# Patient Record
Sex: FEMALE | Race: WHITE | NOT HISPANIC OR LATINO | Employment: FULL TIME | URBAN - NONMETROPOLITAN AREA
[De-identification: names, ages, dates, MRNs, and addresses within clinical notes are randomized per-mention and may not be internally consistent; named-entity substitution may affect disease eponyms.]

---

## 2023-02-21 ENCOUNTER — EVALUATION (OUTPATIENT)
Dept: PHYSICAL THERAPY | Facility: CLINIC | Age: 23
End: 2023-02-21

## 2023-02-21 DIAGNOSIS — G89.29 CHRONIC RIGHT-SIDED LOW BACK PAIN WITH RIGHT-SIDED SCIATICA: Primary | ICD-10-CM

## 2023-02-21 DIAGNOSIS — M54.41 CHRONIC RIGHT-SIDED LOW BACK PAIN WITH RIGHT-SIDED SCIATICA: Primary | ICD-10-CM

## 2023-02-21 NOTE — PROGRESS NOTES
PT Evaluation     Today's date: 2023  Patient name: Fernando Martines  : 2000  MRN: [id number]  Referring provider: LEYLA Sands*  Dx:   Encounter Diagnosis     ICD-10-CM    1  Chronic right-sided low back pain with right-sided sciatica  M54 41     G89 29          Assessment  Assessment details: Fernando Martines is a pleasant 25 y o  female presenting to PT with cc of acute exacerbation of chronic low back pain with B radicular pain  Pt  States pain began approx 3 months ago insidiously and has become severe  Upon examination, patient was found to have objective deficits as listed below and is displaying ss consistent with lower cross syndrome mm imbalance with piriformis and l/s hypomobility contributors and B sciatica  Pt  Is experiencing subsequent functional deficits including pain and difficulty walking, standing, and navigating stairs  Pt  Was educated on role of PT to address issues and given initial treatment with HEP  Pt  Would benefit from skilled physical therapy to promote improved function and maximize activity tolerance  Symptom irritability: highUnderstanding of Dx/Px/POC: good  Goals  ST weeks  -Pt  Will demonstrate L/S flexion AROM improvement of 25%  -Pt  Will demonstrate improved core stabilizer contraction, promoting improved muscle balance  LT weeks  -Pt  Will demonstrate ability to walk 1 mile without pain, demonstrating improved functional mobility   -Pt  Will demonstrate L/S AROM WNL  -Pt  Will demonstrate I with HEP upon DC  -Pt  Will demonstrate ability to effectively contract core mm with appropriate strength to perform lifting task     Plan  Patient would benefit from: skilled physical therapy  Planned modality interventions: cryotherapy, high voltage pulsed current: spasm management, high voltage pulsed current: pain management, thermotherapy: hydrocollator packs and unattended electrical stimulation  Planned therapy interventions: abdominal trunk stabilization, cognitive skills, functional ROM exercises, home exercise program, therapeutic training, therapeutic exercise, therapeutic activities, stretching, strengthening, postural training, neuromuscular re-education, motor coordination training, manual therapy, joint mobilization and massage  Frequency: 2x week  Duration in weeks: 6  Plan of Care beginning date:23   Plan of Care expiration date: 23  Treatment plan discussed with: patient         Subjective Evaluation     History of Present Illness  Mechanism of injury:   Pain  Current pain ratin  At best pain ratin  At worst pain ratin  Location:  paraspinals, axial low back, B, PSIS, buttocks  Quality: tight, sharp, pressure radiating  Relieving factors: rest, ice, heat and medications  Aggravating factors: walking, standing, sitting, stair climbing, lifting and running  Progression: worsening     Social Support  Steps to enter house: yes  Stairs in house: yes      Employment status: full time  Treatments  Previous Treatment: toradol injection, nsaids  Current Treatment: mm relaxer        Objective      Concurrent Complaints  Negative for night pain, disturbed sleep, bladder dysfunction, bowel dysfunction and saddle (S4) numbness      Postural Observations  Seated posture: fair  Standing posture: fair           Palpation   Left   Muscle spasm in the quadratus lumborum  Tenderness of the erector spinae and quadratus lumborum     Right   Muscle spasm in the quadratus lumborum  Tenderness of the erector spinae and quadratus lumborum  Tenderness      Lumbar Spine  Tenderness in the spinous process  Right Hip  Tenderness in PSIS    Left Hip   Tenderness in the PSIS        Neurological Testing      Sensation      Lumbar   Left   Intact: light touch     Right   Intact: light touch     Reflexes   Left   Patellar (L4): brisk (2+)     Right   Patellar (L4): brisk (2+)     Active Range of Motion      Lumbar   Flexion:  with pain Restriction level: moderate  Extension:  with pain restriction level: moderate  Left lateral flexion:  with pain Restriction level: moderate  Right lateral flexion:  with pain Restriction level: minimal  Left rotation:  WFL  Right rotation:  [facility]     Strength/Myotome Testing      Lumbar   Left   Normal strength     Right   Normal strength     Tests      Lumbar      Left   Negative crossed SLR  Positive Passive SLR     Right   Negative crossed SLR   Positive Passive SLR    Repeated Motions:  peripheralization with repeated flexion        General Comments:     Lower quarter screen   Hips: unremarkable  Knees: unremarkable  Foot/ankle: unremarkable    Precautions: None     Daily Treatment Diary:      Initial Evaluation Date: 02/21/23  Compliance 2/21/23                     Visit Number 1                    Re-Eval  IE                 [facility]   Foto Captured Y                           2/21                     Manual                      stm 5'                     L/s gr 5 mobz LY                                           Ther-Ex                      Warm up -                     Ppt c tra and gluteal iso 10x10"                     Piriformis stretch 4x30"                     90/90 10x10"                     Cat cow 20x                     Lumbar decomp stretch 4x30"                     PPU 20x                                                                                       Neuro Re-Ed                                                                                                Ther-Act                                                               Modalities                      ifc c mhp nv

## 2023-03-01 ENCOUNTER — OFFICE VISIT (OUTPATIENT)
Dept: PHYSICAL THERAPY | Facility: CLINIC | Age: 23
End: 2023-03-01

## 2023-03-01 DIAGNOSIS — G89.29 CHRONIC RIGHT-SIDED LOW BACK PAIN WITH RIGHT-SIDED SCIATICA: Primary | ICD-10-CM

## 2023-03-01 DIAGNOSIS — M54.41 CHRONIC RIGHT-SIDED LOW BACK PAIN WITH RIGHT-SIDED SCIATICA: Primary | ICD-10-CM

## 2023-03-01 NOTE — PROGRESS NOTES
Daily Note     Today's date: 3/1/2023  Patient name: Maria R Ricketts  : 2000  MRN: [id number]  Referring provider: LEYLA Hoffmann*  Dx:   Encounter Diagnosis     ICD-10-CM    1  Chronic right-sided low back pain with right-sided sciatica  M54 41     G89 29                      Subjective: Patient reports she lifted a heavy box of paper at work yesterday and her back has been sore since  She notes 4/10 low back pain at beginning of session  Objective: See treatment diary below      Assessment: Tolerated treatment well with no reports of increased low back pain  Patient required moderate verbal cues to perform exercises with appropriate technique and intensity  Patient would benefit from continued PT to increase trunk mobility and core strength for improve function in daily activities  Plan: Continue per plan of care        Precautions: None     Daily Treatment Diary:      Initial Evaluation Date: 23  Compliance 2/21/23  3/1                   Visit Number 1 2                   Re-Eval  IE                 MC   Foto Captured Y                           2/21  3/1                   Manual                      stm 5'  15'                   L/s gr 5 mobz LY                                           Ther-Ex                      Warm up -  [address] [facility] 10 min                   Ppt c tra and gluteal iso 10x10"  5"x15                   Piriformis stretch 4x30"  4x30"                   90/90 10x10"  10"x10                   Cat cow 20x  20x                   Lumbar decomp stretch 4x30" 4x30"                   PPU 20x  20x                                                                                     Neuro Re-Ed                      PPT c march   2x10                                                                       Ther-Act                                                               Modalities                      ifc c mhp nv

## 2023-03-09 ENCOUNTER — OFFICE VISIT (OUTPATIENT)
Dept: PHYSICAL THERAPY | Facility: CLINIC | Age: 23
End: 2023-03-09

## 2023-03-09 DIAGNOSIS — G89.29 CHRONIC RIGHT-SIDED LOW BACK PAIN WITH RIGHT-SIDED SCIATICA: Primary | ICD-10-CM

## 2023-03-09 DIAGNOSIS — M54.41 CHRONIC RIGHT-SIDED LOW BACK PAIN WITH RIGHT-SIDED SCIATICA: Primary | ICD-10-CM

## 2023-03-09 NOTE — PROGRESS NOTES
Daily Note     Today's date: 3/9/2023  Patient name: Mora Riedel  : 2000  MRN: [id number]  Referring provider: LEYLA Lopez*  Dx:   Encounter Diagnosis     ICD-10-CM    1  Chronic right-sided low back pain with right-sided sciatica  M54 41     G89 29                      Subjective: Patient reports 5 or 6/10 low back pain at beginning of session  She reports mild intermittent tingling down entirety of both LEs with certain activities  Objective: See treatment diary below      Assessment: Tolerated treatment well with some increased low back discomfort, however no increase in radicular symptoms  Patient demonstrated moderate fatigue post session  Patient would benefit from continued PT to increase trunk mobility and core strength for improved function in daily activities  Plan: Continue per plan of care        Precautions: None     Daily Treatment Diary:      Initial Evaluation Date: 23  Compliance 2/21/23  3/1  3/9                 Visit Number 1 2  3                 Re-Eval  IE                 MC   Foto Captured Y                           2/21  3/1  3/9                 Manual                      stm 5'  15'  15'                 L/s gr 5 mobz LY                                           Ther-Ex                      Warm up -  [address] [facility] 10 min  [address] [facility] 10 min                 Ppt c tra and gluteal iso 10x10"  5"x15  5"x20                 Piriformis stretch 4x30"  4x30"  4x30"                 90/90 10x10"  10"x10  10"x10                 Cat cow 20x  20x  20x                 Lumbar decomp stretch 4x30" 4x30"  4x30"                 PPU 20x  20x  20x                                                                                   Neuro Re-Ed                      PPT c march   2x10  2x10                 [facility] press                                                    Ther-Act                                                               Modalities                      ifc c p nv

## 2023-03-13 ENCOUNTER — OFFICE VISIT (OUTPATIENT)
Dept: PHYSICAL THERAPY | Facility: CLINIC | Age: 23
End: 2023-03-13

## 2023-03-13 DIAGNOSIS — G89.29 CHRONIC RIGHT-SIDED LOW BACK PAIN WITH RIGHT-SIDED SCIATICA: Primary | ICD-10-CM

## 2023-03-13 DIAGNOSIS — M54.41 CHRONIC RIGHT-SIDED LOW BACK PAIN WITH RIGHT-SIDED SCIATICA: Primary | ICD-10-CM

## 2023-03-13 NOTE — PROGRESS NOTES
Daily Note     Today's date: 3/13/2023  Patient name: Jose Shin  : 2000  MRN: [id number]  Referring provider: LEYLA Swain*  Dx:   Encounter Diagnosis     ICD-10-CM    1  Chronic right-sided low back pain with right-sided sciatica  M54 41     G89 29           Start Time: 1730  Stop Time: 1815  Total time in clinic (min): 45 minutes    Subjective: Patient reports 5 or 6/10 low back pain at beginning of session  She reports mild intermittent tingling down entirety of both LEs with certain activities  Objective: See treatment diary below      Assessment: Tolerated treatment fair  Decreased exercise tolerance this session  Very guarded posture and movement throughout session  Patient would benefit from continued PT to increase trunk mobility and core strength for improved function in daily activities  Plan: Continue per plan of care        Precautions: None     Daily Treatment Diary:      Initial Evaluation Date: 23  Compliance 2/21/23  3/1  3/9  3/13               Visit Number 1 2  3  4               Re-Eval  IE                 MC   Foto Captured Y                           2/21  3/1  3/9  3/13               Manual                      stm 5'  15'  15'  15'               L/s gr 5 mobz LY                                           Ther-Ex                      Warm up -  [address] [facility] 10 min  [address] [facility] 10 min  NP               Ppt c tra and gluteal iso 10x10"  5"x15  5"x20  5"x20               Piriformis stretch 4x30"  4x30"  4x30"  4x30"               90/90 10x10"  10"x10  10"x10  10"x10               Cat cow 20x  20x  20x  not able               Lumbar decomp stretch 4x30" 4x30"  4x30"  4"x15"               PPU 20x  20x  20x  standing 10x                                                                                 Neuro Re-Ed                      PPT c march   2x10  2x10  2x10               paloff press                                                    Ther-Act Modalities                      ifc c p nv

## 2023-03-16 ENCOUNTER — OFFICE VISIT (OUTPATIENT)
Dept: PHYSICAL THERAPY | Facility: CLINIC | Age: 23
End: 2023-03-16

## 2023-03-16 DIAGNOSIS — M54.41 CHRONIC RIGHT-SIDED LOW BACK PAIN WITH RIGHT-SIDED SCIATICA: Primary | ICD-10-CM

## 2023-03-16 DIAGNOSIS — G89.29 CHRONIC RIGHT-SIDED LOW BACK PAIN WITH RIGHT-SIDED SCIATICA: Primary | ICD-10-CM

## 2023-03-16 NOTE — PROGRESS NOTES
Daily Note     Today's date: 3/16/2023  Patient name: Maria R Ricketts  : 2000  MRN: [id number]  Referring provider: LEYLA Hoffmann*  Dx:   Encounter Diagnosis     ICD-10-CM    1  Chronic right-sided low back pain with right-sided sciatica  M54 41     G89 29                      Subjective: Tana aparicio back is feeling much better today  Objective: See treatment diary below      Assessment: Maria R Ricketts was progressed with PT interventions, focusing on appropriate technique and intensity  Pt  Required mod cuing from therapist to complete  Tolerating core strenghtneing well to promote improved mm balance  Pt  Would benefit from continued physical therapy to promote improved activity tolerance and function  Plan: Continue per plan of care  Progress treatment as tolerated         Precautions: None     Daily Treatment Diary:      Initial Evaluation Date: 23  Compliance 2/21/23  3/1  3/9  3/13  3/16             Visit Number 1 2  3  4  5             Re-Eval  IE       -          MC   Foto Captured Y       -                    2/21  3/1  3/9  3/13  3/16             Manual                      stm 5'  15'  15'  15'  15'             L/s gr 5 mobz LY                                           Ther-Ex                      Warm up -  [address] [facility] [address] 10 min  [address] [facility] 10 min  NP  [address] [facility] 10'             Ppt c tra and gluteal iso 10x10"  5"x15  5"x20  5"x20  5"x20             Piriformis stretch 4x30"  4x30"  4x30"  4x30"  4x30"             90/90 10x10"  10"x10  10"x10  10"x10  10x10"             Cat cow 20x  20x  20x  not able  20x             Lumbar decomp stretch 4x30" 4x30"  4x30"  4"x15"               PPU 20x  20x  20x  standing 10x  20x                                                                               Neuro Re-Ed                      PPT c march   2x10  2x10  2x10  3x0             paloff press     nv        bridges     30x        Curl ups - - - - 20x                     Ther-Act Modalities                      ifc c mhp nv    10' post cp

## 2023-03-20 ENCOUNTER — OFFICE VISIT (OUTPATIENT)
Dept: PHYSICAL THERAPY | Facility: CLINIC | Age: 23
End: 2023-03-20

## 2023-03-20 DIAGNOSIS — G89.29 CHRONIC RIGHT-SIDED LOW BACK PAIN WITH RIGHT-SIDED SCIATICA: Primary | ICD-10-CM

## 2023-03-20 DIAGNOSIS — M54.41 CHRONIC RIGHT-SIDED LOW BACK PAIN WITH RIGHT-SIDED SCIATICA: Primary | ICD-10-CM

## 2023-03-20 NOTE — PROGRESS NOTES
Daily Note     Today's date: 3/20/2023  Patient name: Taniya Islas  : 2000  MRN: [id number]  Referring provider: LEYLA Cartwright*  Dx:   Encounter Diagnosis     ICD-10-CM    1  Chronic right-sided low back pain with right-sided sciatica  M54 41     G89 29                      Subjective: Tana notes minimal pain today  She is very fatigued from long work day  Objective: See treatment diary below      Assessment: Taniya Islas was progressed with PT interventions, focusing on appropriate technique and intensity  Pt  Required mod cuing from therapist to complete  l/s mobz cause immediate reduction in pain  Educated patient on importance of core strength and maintaining mobility  Pt  Would benefit from continued physical therapy to promote improved activity tolerance and function  Plan: Continue per plan of care  Progress treatment as tolerated         Precautions: None     Daily Treatment Diary:      Initial Evaluation Date: 23  Compliance 2/21/23  3/1  3/9  3/13  3/16  3/20           Visit Number 1 2  3  4  5  6           Re-Eval  IE       -          MC   Foto Captured Y       -                    2/21  3/1  3/9  3/13  3/16  3/20           Manual                      stm 5'  13'  13'  15'  15'             L/s gr 5 mobz LY                                           Ther-Ex                      Warm up -  [address] [facility] 10 min  [address] [facility] 10 min  NP  [address] [facility] 10'  [address] [facility] 10'           Ppt c tra and gluteal iso 10x10"  5"x15  5"x20  5"x20  5"x20  5"x20           Piriformis stretch 4x30"  4x30"  4x30"  4x30"  4x30"  4x30" 4          90/90 10x10"  10"x10  10"x10  10"x10  10x10"  10x10"           Cat cow 20x  20x  20x  not able  20x  20x           Lumbar decomp stretch 4x30" 4x30"  4x30"  4"x15"    2x60"           PPU 20x  20x  20x  standing 10x  20x  20x                                                                             Neuro Re-Ed                      PPT c march   2x10  2x10  2x10  3x0  30x irving press     nv 20x blue       bridges     30x 30x       Curl ups - - - - 20x 30x                    Ther-Act                                                               Modalities                      ifc c p nv    10' post cp

## 2023-03-21 ENCOUNTER — OFFICE VISIT (OUTPATIENT)
Dept: PHYSICAL THERAPY | Facility: CLINIC | Age: 23
End: 2023-03-21

## 2023-03-21 DIAGNOSIS — G89.29 CHRONIC RIGHT-SIDED LOW BACK PAIN WITH RIGHT-SIDED SCIATICA: Primary | ICD-10-CM

## 2023-03-21 DIAGNOSIS — M54.41 CHRONIC RIGHT-SIDED LOW BACK PAIN WITH RIGHT-SIDED SCIATICA: Primary | ICD-10-CM

## 2023-03-21 NOTE — PROGRESS NOTES
Daily Note     Today's date: 3/21/2023  Patient name: Jennifer Cooper  : 2000  MRN: [id number]  Referring provider: LEYLA Mc*  Dx:   Encounter Diagnosis     ICD-10-CM    1  Chronic right-sided low back pain with right-sided sciatica  M54 41     G89 29                      Subjective: Patient reports her back is pretty sore from her long day at work  Patient reports she felt good after PT yesterday  Objective: See treatment diary below      Assessment: Tolerated treatment well with no significant increase in low back pain  Patient with moderate fatigue post session  Patient would benefit from continued PT to increase trunk mobility and core strength for improved function in ADLs  Plan: Continue per plan of care        Precautions: None     Daily Treatment Diary:      Initial Evaluation Date: 23  Compliance 2/21/23  3/1  3/9  3/13  3/16  3/20  3/21         Visit Number 1 2  3  4  5  6  7         Re-Eval  IE       -          MC   Foto Captured Y       -                    2/21  3/1  3/9  3/13  3/16  3/20  3/21         Manual                      stm 5'  15'  15'  15'  15'    15'         L/s gr 5 mobz LY            LY                               Ther-Ex                      Warm up -  [facility] 10 min  [facility] 10 min  NP  [facility] 10'  [facility] 10'  [facility] 10'         Ppt c tra and gluteal iso 10x10"  5"x15  5"x20  5"x20  5"x20  5"x20  5"x20         Piriformis stretch 4x30"  4x30"  4x30"  4x30"  4x30"  4x30" 4x30"         90/90 10x10"  10"x10  10"x10  10"x10  10x10"  10x10"  10"x10         Cat cow 20x  20x  20x  not able  20x  20x  20x         Lumbar decomp stretch 4x30" 4x30"  4x30"  4"x15"    2x60"  np         PPU 20x  20x  20x  standing 10x  20x  20x  20x                                                                           Neuro Re-Ed                      PPT c march   2x10  2x10  2x10  3x0  30x  30x         paloff press     nv 20x blue np      bridges     30x 30x 30x      Curl ups - - - - 20x 30x 30x                   Ther-Act                                                               Modalities                      ifc c mhp nv    10' post cp

## 2023-03-27 ENCOUNTER — OFFICE VISIT (OUTPATIENT)
Dept: PHYSICAL THERAPY | Facility: CLINIC | Age: 23
End: 2023-03-27

## 2023-03-27 DIAGNOSIS — G89.29 CHRONIC RIGHT-SIDED LOW BACK PAIN WITH RIGHT-SIDED SCIATICA: Primary | ICD-10-CM

## 2023-03-27 DIAGNOSIS — M54.41 CHRONIC RIGHT-SIDED LOW BACK PAIN WITH RIGHT-SIDED SCIATICA: Primary | ICD-10-CM

## 2023-03-27 NOTE — PROGRESS NOTES
"Daily Note     Today's date: 3/27/2023  Patient name: Aaron Aleman  : 2000  MRN: [id number]  Referring provider: LEYLA Rust*  Dx:   Encounter Diagnosis     ICD-10-CM    1  Chronic right-sided low back pain with right-sided sciatica  M54 41     G89 29           Start Time: 1630  Stop Time: 1730  Total time in clinic (min): 60 minutes    Subjective: Patient reports left sided low back pain today verses the normal right side  She reports no radicular sx today but did have tingling in her feet over the weekend  She reports certain ways she moves she has pain and had pain today lifting a box  Objective: See treatment diary below      Assessment: Tolerated treatment fair today  Patient had intermittent increased soreness during exercises  STM was then performed prone focusing on left side to decrease pain, spasming  Supervising physical therapist then performed mobilizations to lumbar spine with pain improvements noted after  Session then ended with ice prone to low back  Patient would benefit from continued PT to increase trunk mobility and core strength for improved function in ADLs  Plan: Continue per plan of care        Precautions: None      Daily Treatment Diary:      Initial Evaluation Date: 23  Compliance 2/21/23  3/1  3/9  3/13  3/16  3/20  3/21  3/27       Visit Number 1 2  3  4  5  6  7  8       Re-al  IE       -          [address] [facility]       -                    2/21  3/1  3/9  3/13  3/16  3/20  3/21  3/27       Manual                      stm 5'  15'  15'  15'  15'    15'  15'       L/s gr 5 mobz LY            LY  LY                             Ther-Ex                      Warm up -  [address] [facility] [address] 10 min  [address] [facility] [address] 10 min  NP  [address] [facility] [address] 10'  [address] [facility] [address] 10'  [address] [facility] [address] 10'  [address] [facility] [address]  10'       Ppt c tra and gluteal iso 10x10\"  5\"x15  5\"x20  5\"x20  5\"x20  5\"x20  5\"x20  5\"x20       Piriformis stretch 4x30\"  4x30\"  4x30\"  4x30\"  4x30\"  4x30\" 4x30\"  4x30\"       90/90 10x10\"  10\"x10  10\"x10  10\"x10  " "10x10\"  10x10\"  10\"x10  10\"x10       Cat cow 20x  20x  20x  not able  20x  20x  20x  10x       Lumbar decomp stretch 4x30\" 4x30\"  4x30\"  4\"x15\"    2x60\"  np  np       PPU 20x  20x  20x  standing 10x  20x  20x  20x  20x                                                                         Neuro Re-Ed                      PPT c march   2x10  2x10  2x10  3x0  30x  30x  30x       paloff press     nv 20x blue np np     bridges     30x 30x 30x Unable painful     Curl ups - - - - 20x 30x 30x 30x                  Ther-Act                                                               Modalities                      ifc c mhp nv    10' post cp   10' post cp                                                            "

## 2023-03-29 ENCOUNTER — APPOINTMENT (OUTPATIENT)
Dept: PHYSICAL THERAPY | Facility: CLINIC | Age: 23
End: 2023-03-29

## 2023-04-03 ENCOUNTER — OFFICE VISIT (OUTPATIENT)
Dept: PHYSICAL THERAPY | Facility: CLINIC | Age: 23
End: 2023-04-03

## 2023-04-03 DIAGNOSIS — M54.41 CHRONIC RIGHT-SIDED LOW BACK PAIN WITH RIGHT-SIDED SCIATICA: Primary | ICD-10-CM

## 2023-04-03 DIAGNOSIS — G89.29 CHRONIC RIGHT-SIDED LOW BACK PAIN WITH RIGHT-SIDED SCIATICA: Primary | ICD-10-CM

## 2023-04-03 NOTE — PROGRESS NOTES
"Daily Note     Today's date: 4/3/2023  Patient name: Tameka Hernandez  : 2000  MRN: [id number]  Referring provider: LEYLA Valerio*  Dx:   Encounter Diagnosis     ICD-10-CM    1  Chronic right-sided low back pain with right-sided sciatica  M54 41     G89 29                      Subjective: shama notes being ill last week with stomach bug  This caused significant increase in pain  Objective: See treatment diary below      Assessment: Tolerated treatment well  Patient demonstrated fatigue post treatment, exhibited good technique with therapeutic exercises and would benefit from continued PT      Plan: Continue per plan of care  Progress treatment as tolerated         Precautions: None      Daily Treatment Diary:      Initial Evaluation Date: 23  Compliance 2/21/23  3/1  3/9  3/13  3/16  3/20  3/21  3/27  4/3     Visit Number 1 2  3  4  5  6  7  8  9     Re-Eval  IE       -        -     Foto Captured Y       -        -            2/21  3/1  3/9  3/13  3/16  3/20  3/21  3/27  4/3     Manual                      stm 5'  13'  13'  15'  15'    15'  15'  15'     L/s gr 5 mobz [place] Burkitt                             Ther-Ex                      Warm up -  [facility] 10 min  [facility] 10 min  NP  [facility] 10'  [facility] 10'  [facility] 10'  [facility]  10'  [facility] 10'     Ppt c tra and gluteal iso 10x10\"  5\"x15  5\"x20  5\"x20  5\"x20  5\"x20  5\"x20  5\"x20  5\"x20     Piriformis stretch 4x30\"  4x30\"  4x30\"  4x30\"  4x30\"  4x30\" 4x30\"  4x30\"  4x30\"     90/90 10x10\"  10\"x10  10\"x10  10\"x10  10x10\"  10x10\"  10\"x10  10\"x10  10x10\"     Cat cow 20x  20x  20x  not able  20x  20x  20x  10x  10x     Lumbar decomp stretch 4x30\" 4x30\"  4x30\"  4\"x15\"    2x60\"  np  np  4x30\"     PPU 20x  20x  20x  standing 10x  20x  20x  20x  20x  20x                                                                       Neuro Re-Ed                      PPT c march   2x10  2x10  2x10  3x0  30x  30x  30x  30x     paloff press     nv 20x blue np np nv    bridges " 30x 30x 30x Unable painful 30x    Curl ups - - - - 20x 30x 30x 30x 30x    Reverse crunches - - - - - - - - 30x    Ther-Act                                                               Modalities                      ifc c mhp nv    10' post cp   10' post cp

## 2023-04-06 ENCOUNTER — OFFICE VISIT (OUTPATIENT)
Dept: PHYSICAL THERAPY | Facility: CLINIC | Age: 23
End: 2023-04-06

## 2023-04-06 DIAGNOSIS — G89.29 CHRONIC RIGHT-SIDED LOW BACK PAIN WITH RIGHT-SIDED SCIATICA: Primary | ICD-10-CM

## 2023-04-06 DIAGNOSIS — M54.41 CHRONIC RIGHT-SIDED LOW BACK PAIN WITH RIGHT-SIDED SCIATICA: Primary | ICD-10-CM

## 2023-04-06 NOTE — PROGRESS NOTES
"Daily Note     Today's date: 2023  Patient name: Johanny Gardner  : 2000  MRN: [id number]  Referring provider: LEYLA Patel*  Dx:   Encounter Diagnosis     ICD-10-CM    1  Chronic right-sided low back pain with right-sided sciatica  M54 41     G89 29                      Subjective: Just sore today but much better than the beginning  Objective: See treatment diary below      Assessment: Tolerated treatment fair with increased soreness noted as session progressed  She endorses improved symptoms with manual interventions  Patient demonstrated fatigue post treatment, exhibited good technique with therapeutic exercises and would benefit from continued PT      Plan: Continue per plan of care  Progress treatment as tolerated         Precautions: None      Daily Treatment Diary:      Initial Evaluation Date: 23  Compliance 2/21/23  3/1  3/9  3/13  3/16  3/20  3/21  3/27  4/3  4/6   Visit Number 1 2  3  4  5  6  7  8  9  10   Re-Eval  IE       -        -  MC   Foto Captured Y       -        -            2/21  3/1  3/9  3/13  3/16  3/20  3/21  3/27  4/3  4/6   Manual                      stm 5'  13'  15'  15'  15'    15'  15'  15'  15'   L/s gr [facility]                             Ther-Ex                      Warm up -  [facility] 10 min  [facility] 10 min  NP  [facility] 10'  [facility] 10'  [facility] 10'  [facility]  10'  [facility] 10'  [facility] 10'   Ppt c tra and gluteal iso 10x10\"  5\"x15  5\"x20  5\"x20  5\"x20  5\"x20  5\"x20  5\"x20  5\"x20     Piriformis stretch 4x30\"  4x30\"  4x30\"  4x30\"  4x30\"  4x30\" 4x30\"  4x30\"  4x30\"  4x30\"   90/90 10x10\"  10\"x10  10\"x10  10\"x10  10x10\"  10x10\"  10\"x10  10\"x10  10x10\"     Cat cow 20x  20x  20x  not able  20x  20x  20x  10x  10x  10x   Lumbar decomp stretch 4x30\" 4x30\"  4x30\"  4\"x15\"    2x60\"  np  np  4x30\"  4x30\"   PPU 20x  20x  20x  standing 10x  20x  20x  20x  20x  20x  20x                                                                     Neuro Re-Ed                      PPT " c march   2x10  2x10  2x10  3x0  30x  30x  30x  30x  cont nv   paloff press     nv 20x blue np np nv 20x blue   bridges     30x 30x 30x Unable painful 30x Cont nv   Curl ups - - - - 20x 30x 30x 30x 30x Cont nv   Reverse crunches - - - - - - - - 30x Cont nv   Ther-Act                                                               Modalities                      ifc c mhp nv    10' post cp   10' post cp

## 2023-04-12 ENCOUNTER — APPOINTMENT (OUTPATIENT)
Dept: PHYSICAL THERAPY | Facility: CLINIC | Age: 23
End: 2023-04-12

## 2023-04-20 ENCOUNTER — APPOINTMENT (OUTPATIENT)
Dept: PHYSICAL THERAPY | Facility: CLINIC | Age: 23
End: 2023-04-20

## 2024-01-24 ENCOUNTER — APPOINTMENT (EMERGENCY)
Dept: RADIOLOGY | Facility: HOSPITAL | Age: 24
End: 2024-01-24
Payer: COMMERCIAL

## 2024-01-24 ENCOUNTER — HOSPITAL ENCOUNTER (EMERGENCY)
Facility: HOSPITAL | Age: 24
Discharge: HOME | End: 2024-01-24
Attending: EMERGENCY MEDICINE
Payer: COMMERCIAL

## 2024-01-24 VITALS
OXYGEN SATURATION: 98 % | SYSTOLIC BLOOD PRESSURE: 113 MMHG | HEART RATE: 108 BPM | BODY MASS INDEX: 20.99 KG/M2 | WEIGHT: 106.92 LBS | DIASTOLIC BLOOD PRESSURE: 56 MMHG | HEIGHT: 60 IN | TEMPERATURE: 97 F | RESPIRATION RATE: 18 BRPM

## 2024-01-24 DIAGNOSIS — B34.9 VIRAL ILLNESS: Primary | ICD-10-CM

## 2024-01-24 LAB
ANION GAP SERPL CALCULATED.3IONS-SCNC: 10 MMOL/L
BACTERIA UR QL AUTO: ABNORMAL /HPF
BASOPHILS # BLD AUTO: 0.01 THOUSANDS/ÂΜL (ref 0–0.1)
BASOPHILS NFR BLD AUTO: 0 % (ref 0–1)
BILIRUB UR QL STRIP: NEGATIVE
BUN SERPL-MCNC: 9 MG/DL (ref 5–25)
CALCIUM SERPL-MCNC: 9.3 MG/DL (ref 8.4–10.2)
CARDIAC TROPONIN I PNL SERPL HS: <2 NG/L
CHLORIDE SERPL-SCNC: 100 MMOL/L (ref 96–108)
CLARITY UR: CLEAR
CO2 SERPL-SCNC: 21 MMOL/L (ref 21–32)
COLOR UR: YELLOW
CREAT SERPL-MCNC: 1.02 MG/DL (ref 0.6–1.3)
D DIMER PPP FEU-MCNC: 0.77 UG/ML FEU
EOSINOPHIL # BLD AUTO: 0.01 THOUSAND/ÂΜL (ref 0–0.61)
EOSINOPHIL NFR BLD AUTO: 0 % (ref 0–6)
ERYTHROCYTE [DISTWIDTH] IN BLOOD BY AUTOMATED COUNT: 12.4 % (ref 11.6–15.1)
EXT PREGNANCY TEST URINE: NEGATIVE
EXT. CONTROL: NORMAL
GFR SERPL CREATININE-BSD FRML MDRD: 77 ML/MIN/1.73SQ M
GLUCOSE SERPL-MCNC: 94 MG/DL (ref 65–140)
GLUCOSE UR STRIP-MCNC: NEGATIVE MG/DL
HCT VFR BLD AUTO: 39.8 % (ref 34.8–46.1)
HGB BLD-MCNC: 13.5 G/DL (ref 11.5–15.4)
HGB UR QL STRIP.AUTO: ABNORMAL
IMM GRANULOCYTES # BLD AUTO: 0.04 THOUSAND/UL (ref 0–0.2)
IMM GRANULOCYTES NFR BLD AUTO: 1 % (ref 0–2)
KETONES UR STRIP-MCNC: NEGATIVE MG/DL
LEUKOCYTE ESTERASE UR QL STRIP: ABNORMAL
LYMPHOCYTES # BLD AUTO: 0.29 THOUSANDS/ÂΜL (ref 0.6–4.47)
LYMPHOCYTES NFR BLD AUTO: 4 % (ref 14–44)
MCH RBC QN AUTO: 29.3 PG (ref 26.8–34.3)
MCHC RBC AUTO-ENTMCNC: 33.9 G/DL (ref 31.4–37.4)
MCV RBC AUTO: 87 FL (ref 82–98)
MONOCYTES # BLD AUTO: 0.41 THOUSAND/ÂΜL (ref 0.17–1.22)
MONOCYTES NFR BLD AUTO: 6 % (ref 4–12)
MUCOUS THREADS UR QL AUTO: ABNORMAL
NEUTROPHILS # BLD AUTO: 6.76 THOUSANDS/ÂΜL (ref 1.85–7.62)
NEUTS SEG NFR BLD AUTO: 89 % (ref 43–75)
NITRITE UR QL STRIP: NEGATIVE
NON-SQ EPI CELLS URNS QL MICRO: ABNORMAL /HPF
NRBC BLD AUTO-RTO: 0 /100 WBCS
OTHER STN SPEC: ABNORMAL
PH UR STRIP.AUTO: 6.5 [PH]
PLATELET # BLD AUTO: 194 THOUSANDS/UL (ref 149–390)
PMV BLD AUTO: 9 FL (ref 8.9–12.7)
POTASSIUM SERPL-SCNC: 3.8 MMOL/L (ref 3.5–5.3)
PROT UR STRIP-MCNC: NEGATIVE MG/DL
RBC # BLD AUTO: 4.6 MILLION/UL (ref 3.81–5.12)
RBC #/AREA URNS AUTO: ABNORMAL /HPF
SODIUM SERPL-SCNC: 131 MMOL/L (ref 135–147)
SP GR UR STRIP.AUTO: 1.02 (ref 1–1.03)
TSH SERPL DL<=0.05 MIU/L-ACNC: 1.31 UIU/ML (ref 0.45–4.5)
UROBILINOGEN UR QL STRIP.AUTO: 0.2 E.U./DL
WBC # BLD AUTO: 7.52 THOUSAND/UL (ref 4.31–10.16)
WBC #/AREA URNS AUTO: ABNORMAL /HPF

## 2024-01-24 PROCEDURE — 93005 ELECTROCARDIOGRAM TRACING: CPT

## 2024-01-24 PROCEDURE — 80048 BASIC METABOLIC PNL TOTAL CA: CPT | Performed by: PHYSICIAN ASSISTANT

## 2024-01-24 PROCEDURE — 36415 COLL VENOUS BLD VENIPUNCTURE: CPT | Performed by: PHYSICIAN ASSISTANT

## 2024-01-24 PROCEDURE — 71045 X-RAY EXAM CHEST 1 VIEW: CPT

## 2024-01-24 PROCEDURE — 99283 EMERGENCY DEPT VISIT LOW MDM: CPT

## 2024-01-24 PROCEDURE — 96360 HYDRATION IV INFUSION INIT: CPT

## 2024-01-24 PROCEDURE — 99285 EMERGENCY DEPT VISIT HI MDM: CPT | Performed by: PHYSICIAN ASSISTANT

## 2024-01-24 PROCEDURE — 85025 COMPLETE CBC W/AUTO DIFF WBC: CPT | Performed by: PHYSICIAN ASSISTANT

## 2024-01-24 PROCEDURE — 84443 ASSAY THYROID STIM HORMONE: CPT | Performed by: PHYSICIAN ASSISTANT

## 2024-01-24 PROCEDURE — 81001 URINALYSIS AUTO W/SCOPE: CPT | Performed by: PHYSICIAN ASSISTANT

## 2024-01-24 PROCEDURE — 84484 ASSAY OF TROPONIN QUANT: CPT | Performed by: PHYSICIAN ASSISTANT

## 2024-01-24 PROCEDURE — 81025 URINE PREGNANCY TEST: CPT | Performed by: PHYSICIAN ASSISTANT

## 2024-01-24 PROCEDURE — 85379 FIBRIN DEGRADATION QUANT: CPT | Performed by: PHYSICIAN ASSISTANT

## 2024-01-24 RX ORDER — ALBUTEROL SULFATE 90 UG/1
2 AEROSOL, METERED RESPIRATORY (INHALATION)
COMMUNITY
Start: 2023-12-20

## 2024-01-24 RX ORDER — CITALOPRAM 40 MG/1
40 TABLET ORAL DAILY
COMMUNITY
Start: 2022-08-20

## 2024-01-24 RX ADMIN — SODIUM CHLORIDE 1000 ML: 0.9 INJECTION, SOLUTION INTRAVENOUS at 11:28

## 2024-01-24 NOTE — DISCHARGE INSTRUCTIONS
Stay well-hydrated and rest.  Follow-up with your family doctor and please return with new or worsening symptoms  Your x-ray was reviewed today in the emergency department and there was no obvious abnormalities found, however, the imaging will be reviewed by the radiologist later this evening or the following day and if there is any abnormalities found you will get a phone call with those results.

## 2024-01-24 NOTE — Clinical Note
Tana Valenzuela was seen and treated in our emergency department on 1/24/2024.                Diagnosis:     Tana  may return to work on return date.    She may return on this date: 01/25/2024         If you have any questions or concerns, please don't hesitate to call.      Loyda Ramirez PA-C    ______________________________           _______________          _______________  Hospital Representative                              Date                                Time

## 2024-01-24 NOTE — ED PROVIDER NOTES
History  Chief Complaint   Patient presents with    Generalized Body Aches     Patient states she had covid 2 weeks ago. This morning while at work became dizzy with visual disturbance. States that since resolved and now she is nauseous with headache, generalized body aches, cough.      23 year old female presents the emergency department for evaluation.  Patient states while at work today she had an onset of generalized body discomfort.  Reports her vision became blurry and she felt weak.  Vision has since returned to normal.  States at the time she felt nauseous and had a headache.  Continues to have generalized bodyaches.  Reported mild shortness of breath with no chest pain.  Has a persistent cough since recent COVID diagnosis.  Patient states her employer sent her to be seen where she was found to be tachycardic and sent to the emergency and for further evaluation.  Patient states there was concern she may have a blood clot due to being on birth control with recent COVID diagnosis.  Patient denies history of DVT or PE.  Denies chest pain.  She denies hemoptysis.  Reports she has constant low back pain with bilateral leg pain and states she has a history of sciatica.  Denies any leg swelling.        Prior to Admission Medications   Prescriptions Last Dose Informant Patient Reported? Taking?   albuterol (ProAir HFA) 90 mcg/act inhaler   Yes Yes   Sig: Inhale 2 puffs   citalopram (CeleXA) 40 mg tablet   Yes Yes   Sig: Take 40 mg by mouth daily   levothyroxine 50 mcg tablet   Yes Yes   Sig: Take 50 mcg by mouth daily   norelgestromin-ethinyl estradiol (Xulane) 150-35 MCG/24HR   Yes Yes   Sig: Place 1 patch on the skin      Facility-Administered Medications: None       Past Medical History:   Diagnosis Date    Anxiety     Disease of thyroid gland     Seasonal allergies        Past Surgical History:   Procedure Laterality Date    NO PAST SURGERIES         Family History   Problem Relation Age of Onset    No Known  Problems Mother     No Known Problems Father      I have reviewed and agree with the history as documented.    E-Cigarette/Vaping    E-Cigarette Use Never User      E-Cigarette/Vaping Substances     Social History     Tobacco Use    Smoking status: Never    Smokeless tobacco: Never   Vaping Use    Vaping status: Never Used   Substance Use Topics    Alcohol use: Never    Drug use: Never       Review of Systems   Constitutional:  Positive for fatigue. Negative for appetite change and fever.   Eyes:  Positive for visual disturbance.   Respiratory:  Positive for cough and shortness of breath. Negative for choking and stridor.    Cardiovascular: Negative.    Gastrointestinal: Negative.    Genitourinary: Negative.    Musculoskeletal: Negative.    Skin: Negative.    Neurological:  Positive for dizziness and weakness.   All other systems reviewed and are negative.      Physical Exam  Physical Exam  Vitals and nursing note reviewed.   Constitutional:       General: She is not in acute distress.     Appearance: Normal appearance. She is not ill-appearing, toxic-appearing or diaphoretic.   HENT:      Head: Normocephalic.      Nose: Nose normal.   Eyes:      Conjunctiva/sclera: Conjunctivae normal.      Pupils: Pupils are equal, round, and reactive to light.      Comments: Normal gross vision   Cardiovascular:      Rate and Rhythm: Regular rhythm. Tachycardia present.   Pulmonary:      Effort: Pulmonary effort is normal.      Breath sounds: Normal breath sounds. No stridor. No wheezing, rhonchi or rales.   Chest:      Chest wall: No tenderness.   Abdominal:      General: Abdomen is flat. There is no distension.      Palpations: Abdomen is soft.      Tenderness: There is no abdominal tenderness.   Musculoskeletal:         General: Normal range of motion.      Cervical back: Normal range of motion.      Right lower leg: No edema.      Left lower leg: No edema.   Skin:     General: Skin is warm and dry.      Findings: No bruising,  erythema or rash.   Neurological:      General: No focal deficit present.      Mental Status: She is alert and oriented to person, place, and time.   Psychiatric:         Mood and Affect: Mood is anxious.         Vital Signs  ED Triage Vitals [01/24/24 1034]   Temperature Pulse Respirations Blood Pressure SpO2   (!) 97 °F (36.1 °C) (!) 124 18 118/66 100 %      Temp Source Heart Rate Source Patient Position - Orthostatic VS BP Location FiO2 (%)   Temporal Monitor Sitting Right arm --      Pain Score       5           Vitals:    01/24/24 1034 01/24/24 1130 01/24/24 1237   BP: 118/66  113/56   Pulse: (!) 124 99 (!) 108   Patient Position - Orthostatic VS: Sitting           Visual Acuity      ED Medications  Medications   sodium chloride 0.9 % bolus 1,000 mL (0 mL Intravenous Stopped 1/24/24 1228)       Diagnostic Studies  Results Reviewed       Procedure Component Value Units Date/Time    TSH, 3rd generation with Free T4 reflex [[id number]]  (Normal) Collected: 01/24/24 1142    Lab Status: Final result Specimen: Blood Updated: 01/24/24 1228     TSH 3RD GENERATON 1.308 uIU/mL     Urine Microscopic [[id number]]  (Abnormal) Collected: 01/24/24 1117    Lab Status: Final result Specimen: Urine, Clean Catch Updated: 01/24/24 1217     RBC, UA 1-2 /hpf      WBC, UA 0-1 /hpf      Epithelial Cells Moderate /hpf      Bacteria, UA Occasional /hpf      OTHER OBSERVATIONS Transitional Epithelial Cells     MUCUS THREADS Moderate    HS Troponin 0hr (reflex protocol) [[id number]]  (Normal) Collected: 01/24/24 1142    Lab Status: Final result Specimen: Blood Updated: 01/24/24 1205     hs TnI 0hr <2 ng/L     UA (URINE) with reflex to Scope [[id number]]  (Abnormal) Collected: 01/24/24 1117    Lab Status: Final result Specimen: Urine, Clean Catch Updated: 01/24/24 1201     Color, UA Yellow     Clarity, UA Clear     Specific Gravity, UA 1.025     pH, UA 6.5     Leukocytes, UA Small     Nitrite, UA Negative     Protein, UA Negative mg/dl       Glucose, UA Negative mg/dl      Ketones, UA Negative mg/dl      Urobilinogen, UA 0.2 E.U./dl      Bilirubin, UA Negative     Occult Blood, UA Small    Basic metabolic panel [[id number]]  (Abnormal) Collected: 01/24/24 1142    Lab Status: Final result Specimen: Blood Updated: 01/24/24 1159     Sodium 131 mmol/L      Potassium 3.8 mmol/L      Chloride 100 mmol/L      CO2 21 mmol/L      ANION GAP 10 mmol/L      BUN 9 mg/dL      Creatinine 1.02 mg/dL      Glucose 94 mg/dL      Calcium 9.3 mg/dL      eGFR 77 ml/min/1.73sq m     Narrative:      National Kid[employer] Disease Foundation guidelines for Chronic Kidney Disease (CKD):     Stage 1 with normal or high GFR (GFR > 90 mL/min/1.73 square meters)    Stage 2 Mild CKD (GFR = 60-89 mL/min/1.73 square meters)    Stage 3A Moderate CKD (GFR = 45-59 mL/min/1.73 square meters)    Stage 3B Moderate CKD (GFR = 30-44 mL/min/1.73 square meters)    Stage 4 Severe CKD (GFR = 15-29 mL/min/1.73 square meters)    Stage 5 End Stage CKD (GFR <15 mL/min/1.73 square meters)  Note: GFR calculation is accurate only with a steady state creatinine    D-Dimer [[id number]]  (Abnormal) Collected: 01/24/24 1142    Lab Status: Final result Specimen: Blood Updated: 01/24/24 1157     D-Dimer, Quant 0.77 ug/ml FEU     CBC and differential [[id number]]  (Abnormal) Resulted: 01/24/24 1140    Lab Status: Final result Specimen: Blood Updated: 01/24/24 1140     WBC 7.52 Thousand/uL      RBC 4.60 Million/uL      Hemoglobin 13.5 g/dL      Hematocrit 39.8 %      MCV 87 fL      MCH 29.3 pg      MCHC 33.9 g/dL      RDW 12.4 %      MPV 9.0 fL      Platelets 194 Thousands/uL      nRBC 0 /100 WBCs      Neutrophils Relative 89 %      Immat GRANS % 1 %      Lymphocytes Relative 4 %      Monocytes Relative 6 %      Eosinophils Relative 0 %      Basophils Relative 0 %      Neutrophils Absolute 6.76 Thousands/µL      Immature Grans Absolute 0.04 Thousand/uL      Lymphocytes Absolute 0.29 Thousands/µL      Monocytes Absolute  0.41 Thousand/µL      Eosinophils Absolute 0.01 Thousand/µL      Basophils Absolute 0.01 Thousands/µL     Narrative:      This is an appended report.  These results have been appended to a previously verified report.    POCT pregnancy, urine [[id number]]  (Normal) Resulted: 01/24/24 1116    Lab Status: Final result Updated: 01/24/24 1117     EXT Preg Test, Ur Negative     Control Valid                   XR chest 1 view portable   Final Result by Oscar Lauren MD (01/24 1336)      No acute cardiopulmonary disease.                  Workstation performed: [id number]                    Procedures  ECG 12 Lead Documentation Only    Date/Time: 1/24/2024 11:27 PM    Performed by: Loyda Ramirez PA-C  Authorized by: Loyda Ramirez PA-C    Patient location:  ED  Interpretation:     Interpretation: non-specific    Rate:     ECG rate:  119    ECG rate assessment: tachycardic    Rhythm:     Rhythm: sinus tachycardia    Ectopy:     Ectopy: none    QRS:     QRS axis:  Normal    QRS intervals:  Normal  Conduction:     Conduction: normal    ST segments:     ST segments:  Normal  T waves:     T waves: normal             ED Course  ED Course as of 01/24/24 1429   Wed Jan 24, 2024   1123 PREGNANCY TEST URINE: Negative   1209 D-Dimer, Quant(!): 0.77  Using Years algorithm, PE excluded    1210 hs TnI 0hr: <2   1231 I discussed all results and findings with the patient.  She is feeling improved.  Resting comfortably.  Will stay well-hydrated and rest today.  Will follow-up with family doctor and return with any new or worsening symptoms.  Patient was clinically hemodynamically stable for discharge.           Medical Decision Making  23-year-old female presents the emergency department for evaluation of generalized bodyaches, cough, weakness, episode of dizziness with blurry vision today.  Patient at risk for the following but not limited to viral illness, dehydration, pregnancy, PE, arrhythmia, anxiety.  Patient's laboratory  findings were relatively unremarkable.  Her D-dimer was minimally elevated however PE excluded using years criteria.  Patient's EKG was normal sinus tachycardia.  ED interpretation of chest x-ray negative for acute cardiopulmonary findings.  Urine pregnancy negative.  Patient was treated with fluids and did have improvement of symptoms.  She was resting comfortably.  We discussed continued symptomatic treatment at home return precautions and follow-up and patient verbalized understanding.  She is clinically and hemodynamically stable for discharge    Problems Addressed:  Viral illness: acute illness or injury    Amount and/or Complexity of Data Reviewed  Labs: ordered. Decision-making details documented in ED Course.  Radiology: ordered.  ECG/medicine tests: ordered and independent interpretation performed.             Disposition  Final diagnoses:   Viral illness     Time reflects when diagnosis was documented in both MDM as applicable and the Disposition within this note       Time User Action Codes Description Comment    1/24/2024 12:32 PM Loyda Ramirez Add [B34.9] Viral illness           ED Disposition       ED Disposition   Discharge    Condition   Stable    Date/Time   Wed Jan 24, 2024 12:32 PM    Comment   Tana Valenzuela discharge to home/self care.                   Follow-up Information       Follow up With Specialties Details Why Contact Info    Keagan Rivera, DO Internal Medicine   [address] [place] [address] [id number] [phone]              Discharge Medication List as of 1/24/2024 12:32 PM        CONTINUE these medications which have NOT CHANGED    Details   albuterol (ProAir HFA) 90 mcg/act inhaler Inhale 2 puffs, Starting Wed 12/20/2023, Historical Med      citalopram (CeleXA) 40 mg tablet Take 40 mg by mouth daily, Starting Sat 8/20/2022, Historical Med      levothyroxine 50 mcg tablet Take 50 mcg by mouth daily, Historical Med      norelgestromin-ethinyl estradiol (Xulane) 150-35  MCG/24HR Place 1 patch on the skin, Starting Mon 3/29/2021, Historical Med             No discharge procedures on file.    PDMP Review       None            ED Provider  Electronically Signed by             Loyda Ramirez PA-C  01/24/24 3782

## 2024-01-25 LAB
ATRIAL RATE: 119 BPM
P AXIS: 62 DEGREES
PR INTERVAL: 130 MS
QRS AXIS: 41 DEGREES
QRSD INTERVAL: 86 MS
QT INTERVAL: 322 MS
QTC INTERVAL: 452 MS
T WAVE AXIS: 5 DEGREES
VENTRICULAR RATE: 119 BPM